# Patient Record
Sex: FEMALE | Race: WHITE | Employment: FULL TIME | URBAN - METROPOLITAN AREA
[De-identification: names, ages, dates, MRNs, and addresses within clinical notes are randomized per-mention and may not be internally consistent; named-entity substitution may affect disease eponyms.]

---

## 2017-05-26 ENCOUNTER — OFFICE VISIT (OUTPATIENT)
Dept: FAMILY MEDICINE CLINIC | Facility: CLINIC | Age: 52
End: 2017-05-26

## 2017-05-26 VITALS
HEART RATE: 78 BPM | WEIGHT: 162.25 LBS | HEIGHT: 67.5 IN | BODY MASS INDEX: 25.17 KG/M2 | DIASTOLIC BLOOD PRESSURE: 72 MMHG | SYSTOLIC BLOOD PRESSURE: 108 MMHG | TEMPERATURE: 98 F | RESPIRATION RATE: 16 BRPM

## 2017-05-26 DIAGNOSIS — J06.9 VIRAL UPPER RESPIRATORY TRACT INFECTION: ICD-10-CM

## 2017-05-26 DIAGNOSIS — J01.00 ACUTE NON-RECURRENT MAXILLARY SINUSITIS: Primary | ICD-10-CM

## 2017-05-26 PROBLEM — J45.909 ASTHMA: Status: ACTIVE | Noted: 2017-05-26

## 2017-05-26 PROCEDURE — 99214 OFFICE O/P EST MOD 30 MIN: CPT | Performed by: FAMILY MEDICINE

## 2017-05-26 RX ORDER — AMOXICILLIN 500 MG/1
500 CAPSULE ORAL 3 TIMES DAILY
Qty: 30 CAPSULE | Refills: 0 | Status: SHIPPED | OUTPATIENT
Start: 2017-05-26 | End: 2017-06-03 | Stop reason: ALTCHOICE

## 2017-05-26 NOTE — PROGRESS NOTES
[facility]  PRO[facility]  Chief Complaint:   Patient presents with:  Sinus Problem: head congestion, green drainage, headache and was feeling very chilled last night      HPI:   This is a 45year old female coming in for sinus congestio pain, joint pain, swelling or stiffness. NEUROLOGICAL:  Denies headache, seizures, dizziness, syncope, paralysis, ataxia, numbness or tingling in the extremities,change in bowel or bladder control. HEMATOLOGIC:  Denies anemia, bleeding or bruising.   LYMP on 08/15/1964  Annual Physical due on 08/15/1966  Pap Smear,3 Years due on 08/15/1995  Mammogram,1 Yr due on 08/15/2004  FIT Colorectal Screening due on 08/15/2014  Colonoscopy,10 Years due on 08/15/2014    Patient/Caregiver Education: Patient/Caregiver Ed

## 2017-06-03 ENCOUNTER — OFFICE VISIT (OUTPATIENT)
Dept: FAMILY MEDICINE CLINIC | Facility: CLINIC | Age: 52
End: 2017-06-03

## 2017-06-03 ENCOUNTER — TELEPHONE (OUTPATIENT)
Dept: FAMILY MEDICINE CLINIC | Facility: CLINIC | Age: 52
End: 2017-06-03

## 2017-06-03 VITALS
WEIGHT: 164.63 LBS | HEART RATE: 74 BPM | BODY MASS INDEX: 25.84 KG/M2 | HEIGHT: 67 IN | SYSTOLIC BLOOD PRESSURE: 116 MMHG | DIASTOLIC BLOOD PRESSURE: 74 MMHG | TEMPERATURE: 98 F | RESPIRATION RATE: 16 BRPM
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
## 2017-06-03 DIAGNOSIS — F41.8 SITUATIONAL ANXIETY: ICD-10-CM

## 2017-06-03 PROCEDURE — 90715 TDAP VACCINE 7 YRS/> IM: CPT | Performed by: NURSE PRACTITIONER

## 2017-06-03 PROCEDURE — 90471 IMMUNIZATION ADMIN: CPT | Performed by: NURSE PRACTITIONER

## 2017-06-03 PROCEDURE — 99213 OFFICE O/P EST LOW 20 MIN: CPT | Performed by: NURSE PRACTITIONER

## 2017-06-03 RX ORDER — MOMETASONE 50 UG/1
1 SPRAY, METERED NASAL DAILY PRN
COMMUNITY
Start: 2014-05-05

## 2017-06-03 RX ORDER — ALBUTEROL SULFATE 90 UG/1
1 AEROSOL, METERED RESPIRATORY (INHALATION) AS NEEDED
COMMUNITY
Start: 2016-11-11 | End: 2019-02-13

## 2017-06-03 RX ORDER — ALPRAZOLAM 0.25 MG/1
0.25 TABLET ORAL 3 TIMES DAILY PRN
Qty: 20 TABLET | Refills: 0 | Status: SHIPPED | OUTPATIENT
Start: 2017-06-03 | End: 2019-08-27

## 2017-06-03 RX ORDER — ALBUTEROL SULFATE 90 UG/1
1 AEROSOL, METERED RESPIRATORY (INHALATION) AS NEEDED
COMMUNITY
Start: 2007-11-14 | End: 2019-06-26

## 2017-06-03 RX ORDER — AMOXICILLIN AND CLAVULANATE POTASSIUM 875; 125 MG/1; MG/1
1 TABLET, FILM COATED ORAL 2 TIMES DAILY
Qty: 14 TABLET | Refills: 0 | Status: SHIPPED | OUTPATIENT
Start: 2017-06-03 | End: 2017-06-10

## 2017-06-03 RX ORDER — ALPRAZOLAM 0.25 MG/1
TABLET ORAL
COMMUNITY
Start: 2012-07-16 | End: 2017-06-03

## 2017-06-03 NOTE — PROGRESS NOTES
Ania Glynn is a 45year old female. Patient presents with:  Animal Bite: bit by dog on the hand      HPI:   Complaints of bite from a dog 3 hrs ago- right hand-  Small shitzu dog-  Met the dog for the first time-  Bite her for no apparent reason. Comment:Other reaction(s): light headed, dizzy    REVIEW OF SYSTEMS:   GENERAL HEALTH: feels well otherwise  SKIN: see HPI  RESPIRATORY: denies complaints of   CARDIOVASCULAR: denies complaints   MUSCULOSKELETAL:  See HPI  NEURO: denies numbness or tinglin Augmentin. If you still have some sinus congestion then finish the full 7 days. If you have no sinus congestion at all and your wound on your hand is totally healed then you may take the prescription only for 5 days.     Tetanus shot today (tetanus, dipht

## 2017-06-03 NOTE — PATIENT INSTRUCTIONS
Stop amoxicillin changed to Augmentin. If you still have some sinus congestion then finish the full 7 days. If you have no sinus congestion at all and your wound on your hand is totally healed then you may take the prescription only for 5 days.     Filiberto Segovia

## 2017-06-03 NOTE — TELEPHONE ENCOUNTER
Patient states she suffered a dog bite this morning  Dog is her brother's dog  Brother has dementia and going through divorce  Patient unsure of status of dog's immunizations  No tetanus imms in Epic or [employer]  Advised patient she need to come in to ge

## 2018-04-10 ENCOUNTER — OFFICE VISIT (OUTPATIENT)
Dept: FAMILY MEDICINE CLINIC | Facility: CLINIC | Age: 53
End: 2018-04-10

## 2018-04-10 VITALS
HEIGHT: 67.75 IN | HEART RATE: 68 BPM | BODY MASS INDEX: 26.37 KG/M2 | DIASTOLIC BLOOD PRESSURE: 64 MMHG | SYSTOLIC BLOOD PRESSURE: 102 MMHG | TEMPERATURE: 98 F | WEIGHT: 172 LBS

## 2018-04-10 DIAGNOSIS — J04.0 LARYNGITIS: Primary | ICD-10-CM

## 2018-04-10 PROCEDURE — 99213 OFFICE O/P EST LOW 20 MIN: CPT | Performed by: NURSE PRACTITIONER

## 2018-04-10 NOTE — PATIENT INSTRUCTIONS
Rest, increase fluids, salt water gargles ,amirah pot (use distilled water) or saline nasal spray, Mucinex, Tylenol/Ibuprofen, follow up if symptoms persist or increase.    Claritin  Laryngitis    Laryngitis is a swelling of the tissues around the vocal cor

## 2018-04-10 NOTE — PROGRESS NOTES
CHIEF COMPLAINT:   Patient presents with:  Cough: productive started last night - slight cough   Voice Problem: no voice since Saturday      HPI:   Siobhan Lara is a 47year old female who presents to clinic today with complaints of laryngitis- st GENERAL: well developed, well nourished,in no apparent distress  HEAD:  mild tenderness on palpation of maxillary sinuses  EYES: conjunctiva clear, no discharge  EARS:.   Tympanic membranes are clear bilaterally  NOSE: nostrils patent, mild congestion  LYMP When to seek medical advice  Contact your healthcare provider for any of the following:  · Severe pain with swallowing  · Trouble opening mouth  · Neck swelling, neck pain, or trouble moving neck  · Noisy breathing or trouble breathing  · Fever of 100.4°F

## 2018-04-13 ENCOUNTER — TELEPHONE (OUTPATIENT)
Dept: FAMILY MEDICINE CLINIC | Facility: CLINIC | Age: 53
End: 2018-04-13

## 2018-04-13 NOTE — TELEPHONE ENCOUNTER
Patient saw Damien Ramírez recently for laryngitis. Was not given antibiotics because it is viral.   But patient states there has not been any improvement. Has been gargling salt water to help and neti pot.  But states the neti pot does not go through the other malgorzata

## 2018-04-13 NOTE — TELEPHONE ENCOUNTER
Laryngitis may take 1-2 weeks to resolve. (See home care instructions at end of office note). Avoid whispering, try to avoid talking as much as possible,  If she taking the Claritin and ibuprofen?   She could add a steroid nasal spray such as Flonase, Eric

## 2018-09-17 ENCOUNTER — OFFICE VISIT (OUTPATIENT)
Dept: FAMILY MEDICINE CLINIC | Facility: CLINIC | Age: 53
End: 2018-09-17
Payer: COMMERCIAL

## 2018-09-17 VITALS
WEIGHT: 173 LBS | RESPIRATION RATE: 18 BRPM | HEART RATE: 62 BPM | BODY MASS INDEX: 26.52 KG/M2 | DIASTOLIC BLOOD PRESSURE: 68 MMHG | TEMPERATURE: 98 F | OXYGEN SATURATION: 98 % | HEIGHT: 67.75 IN | SYSTOLIC BLOOD PRESSURE: 102 MMHG

## 2018-09-17 DIAGNOSIS — J01.00 ACUTE NON-RECURRENT MAXILLARY SINUSITIS: Primary | ICD-10-CM

## 2018-09-17 PROCEDURE — 99214 OFFICE O/P EST MOD 30 MIN: CPT | Performed by: NURSE PRACTITIONER

## 2018-09-17 RX ORDER — AMOXICILLIN 875 MG/1
875 TABLET, COATED ORAL 2 TIMES DAILY
Qty: 20 TABLET | Refills: 0 | Status: SHIPPED | OUTPATIENT
Start: 2018-09-17 | End: 2018-09-27

## 2018-09-17 NOTE — PROGRESS NOTES
CHIEF COMPLAINT:   Patient presents with:  Sinus Problem: facial pressure, head congestion, 4 weeks      HPI:   Naveen Suresh is a 46year old female who presents to clinic today with complaints of sinus pressure for 4 weeks- pain to maxillary sinu complaints    EXAM:   /68 (BP Location: Left arm, Patient Position: Sitting, Cuff Size: large)   Pulse 62   Temp 97.6 °F (36.4 °C) (Temporal)   Resp 18   Ht 67.75\"   Wt 173 lb   SpO2 98%   BMI 26.50 kg/m²   GENERAL: well developed, well nourished,in 2:15 PM

## 2018-09-17 NOTE — PATIENT INSTRUCTIONS
My fitness pal phone anil-  Weight. Watcher's     Rest, increase fluids, salt water gargles ,amirah pot (use distilled water) or saline nasal spray, Tylenol/Ibuprofen,  Claritin,  Flonase, Rhinocort, Nasacort-  2 sprays each nostril once a day.   follow up i

## 2018-09-19 ENCOUNTER — TELEPHONE (OUTPATIENT)
Dept: FAMILY MEDICINE CLINIC | Facility: CLINIC | Age: 53
End: 2018-09-19

## 2018-09-19 DIAGNOSIS — Z00.00 ENCOUNTER FOR HEALTH MAINTENANCE EXAMINATION IN ADULT: Primary | ICD-10-CM

## 2018-09-19 NOTE — TELEPHONE ENCOUNTER
Pt informed. Call routed to appt desk.     Future Appointments   Date Time Provider Quinton Gonzalez   9/26/2018  9:15 AM REF [place] [facility]   10/10/2018 10:00 AM Sarah Hadley MD [facility] [place] [facility] [place]

## 2018-09-19 NOTE — TELEPHONE ENCOUNTER
Fasting blood work orders entered–I believe patient was going to have this prior to a physical please confirm.

## 2018-09-19 NOTE — TELEPHONE ENCOUNTER
If patient would like to see her gynecologist, that is okay, otherwise please let her know that we do do Pap smears here.   We do technically recommend a physical also be done with her primary even if we do not do her Pap smear/breast exam

## 2018-09-19 NOTE — TELEPHONE ENCOUNTER
FYI - Pt notified and verbalized understanding. She states that she is only planning on scheduling a yearly visit with her gyn because it's been a few years since she had a check up there.

## 2018-09-19 NOTE — TELEPHONE ENCOUNTER
Your appointments     Date & Time Appointment Department [facility])    Sep 26, 2018  9:15 AM CDT Laboratory Visit with REF [facility] ([facility])        [facility]  [facility] [address]

## 2018-09-26 ENCOUNTER — LABORATORY ENCOUNTER (OUTPATIENT)
Dept: LAB | Age: 53
End: 2018-09-26
Attending: FAMILY MEDICINE
Payer: COMMERCIAL

## 2018-09-26 DIAGNOSIS — Z00.00 ENCOUNTER FOR HEALTH MAINTENANCE EXAMINATION IN ADULT: ICD-10-CM

## 2018-09-26 LAB
ALBUMIN SERPL-MCNC: 3.5 G/DL (ref 3.5–4.8)
ALBUMIN/GLOB SERPL: 1.1 {RATIO} (ref 1–2)
ALP LIVER SERPL-CCNC: 58 U/L (ref 41–108)
ALT SERPL-CCNC: 26 U/L (ref 14–54)
ANION GAP SERPL CALC-SCNC: 6 MMOL/L (ref 0–18)
AST SERPL-CCNC: 18 U/L (ref 15–41)
BASOPHILS # BLD AUTO: 0.02 X10(3) UL (ref 0–0.1)
BASOPHILS NFR BLD AUTO: 0.4 %
BILIRUB SERPL-MCNC: 0.6 MG/DL (ref 0.1–2)
BUN BLD-MCNC: 9 MG/DL (ref 8–20)
BUN/CREAT SERPL: 10.5 (ref 10–20)
CALCIUM BLD-MCNC: 8.8 MG/DL (ref 8.3–10.3)
CHLORIDE SERPL-SCNC: 107 MMOL/L (ref 101–111)
CHOLEST SMN-MCNC: 214 MG/DL (ref ?–200)
CO2 SERPL-SCNC: 27 MMOL/L (ref 22–32)
CREAT BLD-MCNC: 0.86 MG/DL (ref 0.55–1.02)
EOSINOPHIL # BLD AUTO: 0.14 X10(3) UL (ref 0–0.3)
EOSINOPHIL NFR BLD AUTO: 3 %
ERYTHROCYTE [DISTWIDTH] IN BLOOD BY AUTOMATED COUNT: 13.2 % (ref 11.5–16)
GLOBULIN PLAS-MCNC: 3.1 G/DL (ref 2.5–4)
GLUCOSE BLD-MCNC: 91 MG/DL (ref 70–99)
HAV AB SERPL IA-ACNC: 369 PG/ML (ref 193–986)
HCT VFR BLD AUTO: 38.1 % (ref 34–50)
HDLC SERPL-MCNC: 70 MG/DL (ref 40–59)
HGB BLD-MCNC: 12.4 G/DL (ref 12–16)
IMMATURE GRANULOCYTE COUNT: 0.01 X10(3) UL (ref 0–1)
IMMATURE GRANULOCYTE RATIO %: 0.2 %
LDLC SERPL CALC-MCNC: 126 MG/DL (ref ?–100)
LYMPHOCYTES # BLD AUTO: 1.57 X10(3) UL (ref 0.9–4)
LYMPHOCYTES NFR BLD AUTO: 33.1 %
M PROTEIN MFR SERPL ELPH: 6.6 G/DL (ref 6.1–8.3)
MCH RBC QN AUTO: 30 PG (ref 27–33.2)
MCHC RBC AUTO-ENTMCNC: 32.5 G/DL (ref 31–37)
MCV RBC AUTO: 92.3 FL (ref 81–100)
MONOCYTES # BLD AUTO: 0.35 X10(3) UL (ref 0.1–1)
MONOCYTES NFR BLD AUTO: 7.4 %
NEUTROPHIL ABS PRELIM: 2.65 X10 (3) UL (ref 1.3–6.7)
NEUTROPHILS # BLD AUTO: 2.65 X10(3) UL (ref 1.3–6.7)
NEUTROPHILS NFR BLD AUTO: 55.9 %
NONHDLC SERPL-MCNC: 144 MG/DL (ref ?–130)
OSMOLALITY SERPL CALC.SUM OF ELEC: 288 MOSM/KG (ref 275–295)
PLATELET # BLD AUTO: 235 10(3)UL (ref 150–450)
POTASSIUM SERPL-SCNC: 4.2 MMOL/L (ref 3.6–5.1)
RBC # BLD AUTO: 4.13 X10(6)UL (ref 3.8–5.1)
RED CELL DISTRIBUTION WIDTH-SD: 44.1 FL (ref 35.1–46.3)
SODIUM SERPL-SCNC: 140 MMOL/L (ref 136–144)
T4 FREE SERPL-MCNC: 1 NG/DL (ref 0.9–1.8)
TRIGL SERPL-MCNC: 91 MG/DL (ref 30–149)
TSI SER-ACNC: 2.27 MIU/ML (ref 0.35–5.5)
VIT D+METAB SERPL-MCNC: 18.1 NG/ML (ref 30–100)
VLDLC SERPL CALC-MCNC: 18 MG/DL (ref 0–30)
WBC # BLD AUTO: 4.7 X10(3) UL (ref 4–13)

## 2018-09-26 PROCEDURE — 82607 VITAMIN B-12: CPT | Performed by: NURSE PRACTITIONER

## 2018-09-26 PROCEDURE — 36415 COLL VENOUS BLD VENIPUNCTURE: CPT | Performed by: NURSE PRACTITIONER

## 2018-09-26 PROCEDURE — 84439 ASSAY OF FREE THYROXINE: CPT | Performed by: NURSE PRACTITIONER

## 2018-09-26 PROCEDURE — 82306 VITAMIN D 25 HYDROXY: CPT | Performed by: NURSE PRACTITIONER

## 2018-09-26 PROCEDURE — 80050 GENERAL HEALTH PANEL: CPT | Performed by: NURSE PRACTITIONER

## 2018-09-26 PROCEDURE — 80061 LIPID PANEL: CPT | Performed by: NURSE PRACTITIONER

## 2018-09-27 ENCOUNTER — TELEPHONE (OUTPATIENT)
Dept: FAMILY MEDICINE CLINIC | Facility: CLINIC | Age: 53
End: 2018-09-27

## 2018-09-27 NOTE — TELEPHONE ENCOUNTER
----- Message from ALEKSANDR García sent at 9/27/2018 11:48 AM CDT -----  Please notify patient that overall her blood work is good, her cholesterol is just a little high at 214, however good cholesterol is very high at 70 which is a good thing, her LD

## 2019-02-13 ENCOUNTER — OFFICE VISIT (OUTPATIENT)
Dept: FAMILY MEDICINE CLINIC | Facility: CLINIC | Age: 54
End: 2019-02-13
Payer: COMMERCIAL

## 2019-02-13 ENCOUNTER — HOSPITAL ENCOUNTER (OUTPATIENT)
Dept: GENERAL RADIOLOGY | Age: 54
Discharge: HOME | End: 2019-02-13
Attending: NURSE PRACTITIONER
Payer: COMMERCIAL

## 2019-02-13 VITALS
DIASTOLIC BLOOD PRESSURE: 70 MMHG | TEMPERATURE: 99 F | BODY MASS INDEX: 20.47 KG/M2 | SYSTOLIC BLOOD PRESSURE: 116 MMHG | WEIGHT: 132 LBS | HEIGHT: 67.25 IN

## 2019-02-13 DIAGNOSIS — M54.50 CHRONIC RIGHT-SIDED LOW BACK PAIN WITHOUT SCIATICA: Primary | ICD-10-CM

## 2019-02-13 DIAGNOSIS — M54.50 CHRONIC RIGHT-SIDED LOW BACK PAIN WITHOUT SCIATICA: ICD-10-CM

## 2019-02-13 DIAGNOSIS — G89.29 CHRONIC RIGHT-SIDED LOW BACK PAIN WITHOUT SCIATICA: Primary | ICD-10-CM

## 2019-02-13 DIAGNOSIS — G89.29 CHRONIC RIGHT-SIDED LOW BACK PAIN WITHOUT SCIATICA: ICD-10-CM

## 2019-02-13 PROBLEM — F41.9 ANXIETY AND DEPRESSION: Status: ACTIVE | Noted: 2017-06-03

## 2019-02-13 PROBLEM — F32.A ANXIETY AND DEPRESSION: Status: ACTIVE | Noted: 2017-06-03

## 2019-02-13 PROCEDURE — 99214 OFFICE O/P EST MOD 30 MIN: CPT | Performed by: NURSE PRACTITIONER

## 2019-02-13 PROCEDURE — 72110 X-RAY EXAM L-2 SPINE 4/>VWS: CPT | Performed by: NURSE PRACTITIONER

## 2019-02-13 RX ORDER — MELOXICAM 15 MG/1
15 TABLET ORAL DAILY
Qty: 30 TABLET | Refills: 3 | Status: SHIPPED | OUTPATIENT
Start: 2019-02-13 | End: 2019-08-17 | Stop reason: DRUGHIGH

## 2019-02-13 RX ORDER — CYCLOBENZAPRINE HCL 10 MG
10 TABLET ORAL NIGHTLY PRN
Qty: 30 TABLET | Refills: 1 | Status: SHIPPED | OUTPATIENT
Start: 2019-02-13 | End: 2019-03-05

## 2019-02-13 NOTE — PROGRESS NOTES
Micheline Osborn is a 46year old female. Patient presents with:   Other: referral for PT      HPI:   Complaints of back pain - has seen the Chiropractor, years- mostly lower back- and shoulders   Has not had an x-ray in years   States she has been seei Allergies    Levofloxacin                Comment:Other reaction(s): light headed, dizzy    REVIEW OF SYSTEMS:   GENERAL HEALTH: feels well otherwise  HEENT: denies complaints  SKIN: denies any unusual skin lesions or rashes  RESPIRATORY: denies shortne orthotics, you may try heat or ice to your back. Take meloxicam once a day with food. You may take cyclobenzaprine at bedtime for muscle relaxation.     Follow-up with physical therapy

## 2019-02-14 ENCOUNTER — TELEPHONE (OUTPATIENT)
Dept: FAMILY MEDICINE CLINIC | Facility: CLINIC | Age: 54
End: 2019-02-14

## 2019-02-14 DIAGNOSIS — G89.29 CHRONIC LOW BACK PAIN WITHOUT SCIATICA, UNSPECIFIED BACK PAIN LATERALITY: Primary | ICD-10-CM

## 2019-02-14 DIAGNOSIS — M54.50 CHRONIC LOW BACK PAIN WITHOUT SCIATICA, UNSPECIFIED BACK PAIN LATERALITY: Primary | ICD-10-CM

## 2019-06-17 PROBLEM — R92.2 DENSE BREASTS: Status: ACTIVE | Noted: 2019-06-17

## 2019-06-17 PROBLEM — N89.8 VAGINAL CYSTS: Status: ACTIVE | Noted: 2019-06-17

## 2019-06-17 PROCEDURE — 88175 CYTOPATH C/V AUTO FLUID REDO: CPT | Performed by: OBSTETRICS & GYNECOLOGY

## 2019-06-17 PROCEDURE — 87624 HPV HI-RISK TYP POOLED RSLT: CPT | Performed by: OBSTETRICS & GYNECOLOGY

## 2019-06-26 ENCOUNTER — TELEPHONE (OUTPATIENT)
Dept: FAMILY MEDICINE CLINIC | Facility: CLINIC | Age: 54
End: 2019-06-26

## 2019-06-26 RX ORDER — ALBUTEROL SULFATE 90 UG/1
1 AEROSOL, METERED RESPIRATORY (INHALATION) AS NEEDED
Qty: 1 INHALER | Refills: 0 | Status: SHIPPED | OUTPATIENT
Start: 2019-06-26 | End: 2019-08-27

## 2019-06-26 NOTE — TELEPHONE ENCOUNTER
RE:   taking a flight in August    in the past Betzy Garrickrip has prescibed a medication for her to take pre-flight // Also needs RF of inhaler ( uses PRN)      RX to [facility] in [place]     please advise

## 2019-06-26 NOTE — TELEPHONE ENCOUNTER
Pt states she is traveling to [facility] in Aug or Sept , would like a refill of XAnax. Last issued back . Pt also looking for a refill of her inhaler. Pt hx: of asthma. Pt is only using prn,  Pt states her inhaler is .     Pt last seen in

## 2019-06-26 NOTE — TELEPHONE ENCOUNTER
Pt informed, appt given.     Future Appointments   Date Time Provider Quinton Duffyi   8/5/2019  1:30 PM Veronica Campbell MD [facility] [place] [facility]   6/17/2020  9:15 AM Juan Medina MD [place] [id number]

## 2019-06-26 NOTE — TELEPHONE ENCOUNTER
Patient chart reviewed. Patient list me as her primary care provider although I have not ever seen her. I do advise an appointment for a medical physical and asthma follow-up. Patient may have a prescription for 1 inhaler to avoid any gap in care.   Disc

## 2019-08-16 ENCOUNTER — TELEPHONE (OUTPATIENT)
Dept: FAMILY MEDICINE CLINIC | Facility: CLINIC | Age: 54
End: 2019-08-16

## 2019-08-16 NOTE — TELEPHONE ENCOUNTER
Pt states that she feels that she may have a sinus infection. Pt c/o sinus pressure, thick nasal drainage- yellow/green, and sinus headache. Pt states s/s started on Monday. Pt states she had chills on Tuesday.   Pt states she had done sinus rinse otc an

## 2019-08-17 ENCOUNTER — OFFICE VISIT (OUTPATIENT)
Dept: FAMILY MEDICINE CLINIC | Facility: CLINIC | Age: 54
End: 2019-08-17
Payer: COMMERCIAL

## 2019-08-17 VITALS
TEMPERATURE: 97 F | HEART RATE: 76 BPM | HEIGHT: 67 IN | WEIGHT: 172 LBS | RESPIRATION RATE: 16 BRPM | DIASTOLIC BLOOD PRESSURE: 60 MMHG | BODY MASS INDEX: 27 KG/M2 | SYSTOLIC BLOOD PRESSURE: 96 MMHG | OXYGEN SATURATION: 99 %

## 2019-08-17 DIAGNOSIS — J32.9 SINOBRONCHITIS: Primary | ICD-10-CM

## 2019-08-17 DIAGNOSIS — J40 SINOBRONCHITIS: Primary | ICD-10-CM

## 2019-08-17 DIAGNOSIS — H66.93 ACUTE EAR INFECTION, BILATERAL: ICD-10-CM

## 2019-08-17 PROCEDURE — 99214 OFFICE O/P EST MOD 30 MIN: CPT | Performed by: NURSE PRACTITIONER

## 2019-08-17 RX ORDER — MELOXICAM 15 MG/1
15 TABLET ORAL
COMMUNITY
End: 2019-08-27 | Stop reason: ALTCHOICE

## 2019-08-17 RX ORDER — AMOXICILLIN AND CLAVULANATE POTASSIUM 875; 125 MG/1; MG/1
1 TABLET, FILM COATED ORAL 2 TIMES DAILY
Qty: 20 TABLET | Refills: 0 | Status: SHIPPED | OUTPATIENT
Start: 2019-08-17 | End: 2019-08-27 | Stop reason: ALTCHOICE

## 2019-08-17 NOTE — PATIENT INSTRUCTIONS
Directed to take antibiotics until gone. Recommend to eat yogurt or take probiotic daily while on antibiotic, but not the same time as the antibiotic. Treat symptoms as needed. Return to clinic if not better in 48-72 hours.   Otherwise follow-up as need

## 2019-08-17 NOTE — PROGRESS NOTES
HPI:    Patient ID: Chuck Raya is a 53year old female. HPI     Started with congestin with ST on Monday. Started in head and now is moving down. Has a hx of sinus infections. Is using erick patient.  States that headaches are terrible - did hav well-developed and well-nourished. No distress. HENT:   Head: Normocephalic and atraumatic. Right Ear: Hearing, external ear and ear canal normal. Tympanic membrane is erythematous and retracted.    Left Ear: Hearing, external ear and ear canal normal.

## 2019-08-27 ENCOUNTER — OFFICE VISIT (OUTPATIENT)
Dept: FAMILY MEDICINE CLINIC | Facility: CLINIC | Age: 54
End: 2019-08-27
Payer: COMMERCIAL

## 2019-08-27 VITALS
OXYGEN SATURATION: 99 % | BODY MASS INDEX: 27.49 KG/M2 | WEIGHT: 175.19 LBS | HEIGHT: 67 IN | RESPIRATION RATE: 14 BRPM | HEART RATE: 60 BPM | DIASTOLIC BLOOD PRESSURE: 66 MMHG | SYSTOLIC BLOOD PRESSURE: 104 MMHG | TEMPERATURE: 98 F

## 2019-08-27 DIAGNOSIS — F32.A ANXIETY AND DEPRESSION: ICD-10-CM

## 2019-08-27 DIAGNOSIS — Z00.00 ANNUAL PHYSICAL EXAM: Primary | ICD-10-CM

## 2019-08-27 DIAGNOSIS — N39.3 STRESS INCONTINENCE: ICD-10-CM

## 2019-08-27 DIAGNOSIS — F41.9 ANXIETY AND DEPRESSION: ICD-10-CM

## 2019-08-27 PROCEDURE — 99396 PREV VISIT EST AGE 40-64: CPT | Performed by: FAMILY MEDICINE

## 2019-08-27 RX ORDER — ALPRAZOLAM 0.25 MG/1
0.25 TABLET ORAL 3 TIMES DAILY PRN
Qty: 20 TABLET | Refills: 0 | Status: SHIPPED | OUTPATIENT
Start: 2019-08-27 | End: 2020-09-08

## 2019-08-27 NOTE — PROGRESS NOTES
[place] [facility] [place]  PROGRESS NOTE  Chief Complaint:   Patient presents with:  Physical      HPI:   This is a 53year old female coming in for annual check up. Pt sees gyne.     Monitor weight- menopausal  Watching diet and trying to exercise    R False negative and false positive results can occur. Regular sampling is recommended to minimize false negative results.       Case Report       Gynecologic Cytology                              Case: [id number]                                  YWNIVUNSHFE Onset   • High Blood Pressure Mother    • High Blood Pressure Brother    • High Blood Pressure Sister    • Diabetes Paternal Grandmother      Allergies:    Levofloxacin                Comment:Other reaction(s): light headed, dizzy  Current Meds:    Current intolerance, polyuria or polydipsia. ALLERGIES:  Denies allergic response, history of asthma, sneezing, hives, eczema or rhinitis.      EXAM:   /66 (BP Location: Left arm, Patient Position: Sitting, Cuff Size: adult)   Pulse 60   Temp 97.8 °F (36.6 ° Future  - TSH W REFLEX TO FREE T4; Future  - URINALYSIS WITH CULTURE REFLEX; Future  - VITAMIN D, 25-HYDROXY; Future    2. Anxiety and depression  Discussed with patient use of Xanax for travel related anxiety.     3. Stress incontinence  New issue I suppor

## 2019-08-27 NOTE — PATIENT INSTRUCTIONS
rec fasting labs-- recheck low vit D and cholesterol    rec yearly mammogram    Monitor  sebaceous cyst- left chest wall  Consider surgical removal    Consider [employer] for pelvic floor therapy    Continue flonase     ok for xanax for travel a

## 2019-08-30 ENCOUNTER — LABORATORY ENCOUNTER (OUTPATIENT)
Dept: LAB | Age: 54
End: 2019-08-30
Attending: FAMILY MEDICINE
Payer: COMMERCIAL

## 2019-08-30 DIAGNOSIS — Z00.00 ANNUAL PHYSICAL EXAM: ICD-10-CM

## 2019-08-30 LAB
ALBUMIN SERPL-MCNC: 3.7 G/DL (ref 3.4–5)
ALBUMIN/GLOB SERPL: 1.2 {RATIO} (ref 1–2)
ALP LIVER SERPL-CCNC: 59 U/L (ref 41–108)
ALT SERPL-CCNC: 24 U/L (ref 13–56)
ANION GAP SERPL CALC-SCNC: 7 MMOL/L (ref 0–18)
AST SERPL-CCNC: 23 U/L (ref 15–37)
BASOPHILS # BLD AUTO: 0.05 X10(3) UL (ref 0–0.2)
BASOPHILS NFR BLD AUTO: 0.8 %
BILIRUB SERPL-MCNC: 0.6 MG/DL (ref 0.1–2)
BILIRUB UR QL STRIP.AUTO: NEGATIVE
BUN BLD-MCNC: 13 MG/DL (ref 7–18)
BUN/CREAT SERPL: 14.6 (ref 10–20)
CALCIUM BLD-MCNC: 9.2 MG/DL (ref 8.5–10.1)
CHLORIDE SERPL-SCNC: 105 MMOL/L (ref 98–112)
CHOLEST SMN-MCNC: 216 MG/DL (ref ?–200)
CLARITY UR REFRACT.AUTO: CLEAR
CO2 SERPL-SCNC: 26 MMOL/L (ref 21–32)
CREAT BLD-MCNC: 0.89 MG/DL (ref 0.55–1.02)
DEPRECATED RDW RBC AUTO: 43.6 FL (ref 35.1–46.3)
EOSINOPHIL # BLD AUTO: 0.23 X10(3) UL (ref 0–0.7)
EOSINOPHIL NFR BLD AUTO: 3.7 %
ERYTHROCYTE [DISTWIDTH] IN BLOOD BY AUTOMATED COUNT: 12.7 % (ref 11–15)
GLOBULIN PLAS-MCNC: 3.1 G/DL (ref 2.8–4.4)
GLUCOSE BLD-MCNC: 83 MG/DL (ref 70–99)
GLUCOSE UR STRIP.AUTO-MCNC: NEGATIVE MG/DL
HCT VFR BLD AUTO: 41.5 % (ref 35–48)
HDLC SERPL-MCNC: 73 MG/DL (ref 40–59)
HGB BLD-MCNC: 13.2 G/DL (ref 12–16)
IMM GRANULOCYTES # BLD AUTO: 0.02 X10(3) UL (ref 0–1)
IMM GRANULOCYTES NFR BLD: 0.3 %
KETONES UR STRIP.AUTO-MCNC: NEGATIVE MG/DL
LDLC SERPL CALC-MCNC: 114 MG/DL (ref ?–100)
LEUKOCYTE ESTERASE UR QL STRIP.AUTO: NEGATIVE
LYMPHOCYTES # BLD AUTO: 1.75 X10(3) UL (ref 1–4)
LYMPHOCYTES NFR BLD AUTO: 28.5 %
M PROTEIN MFR SERPL ELPH: 6.8 G/DL (ref 6.4–8.2)
MCH RBC QN AUTO: 29.7 PG (ref 26–34)
MCHC RBC AUTO-ENTMCNC: 31.8 G/DL (ref 31–37)
MCV RBC AUTO: 93.5 FL (ref 80–100)
MONOCYTES # BLD AUTO: 0.38 X10(3) UL (ref 0.1–1)
MONOCYTES NFR BLD AUTO: 6.2 %
NEUTROPHILS # BLD AUTO: 3.71 X10 (3) UL (ref 1.5–7.7)
NEUTROPHILS # BLD AUTO: 3.71 X10(3) UL (ref 1.5–7.7)
NEUTROPHILS NFR BLD AUTO: 60.5 %
NITRITE UR QL STRIP.AUTO: NEGATIVE
NONHDLC SERPL-MCNC: 143 MG/DL (ref ?–130)
OSMOLALITY SERPL CALC.SUM OF ELEC: 285 MOSM/KG (ref 275–295)
PH UR STRIP.AUTO: 7 [PH] (ref 4.5–8)
PLATELET # BLD AUTO: 264 10(3)UL (ref 150–450)
POTASSIUM SERPL-SCNC: 4.2 MMOL/L (ref 3.5–5.1)
PROT UR STRIP.AUTO-MCNC: NEGATIVE MG/DL
RBC # BLD AUTO: 4.44 X10(6)UL (ref 3.8–5.3)
RBC UR QL AUTO: NEGATIVE
SODIUM SERPL-SCNC: 138 MMOL/L (ref 136–145)
SP GR UR STRIP.AUTO: 1.01 (ref 1–1.03)
TRIGL SERPL-MCNC: 146 MG/DL (ref 30–149)
TSI SER-ACNC: 1.99 MIU/ML (ref 0.36–3.74)
UROBILINOGEN UR STRIP.AUTO-MCNC: <2 MG/DL
VIT D+METAB SERPL-MCNC: 19.5 NG/ML (ref 30–100)
VLDLC SERPL CALC-MCNC: 29 MG/DL (ref 0–30)
WBC # BLD AUTO: 6.1 X10(3) UL (ref 4–11)

## 2019-08-30 PROCEDURE — 80050 GENERAL HEALTH PANEL: CPT | Performed by: FAMILY MEDICINE

## 2019-08-30 PROCEDURE — 81003 URINALYSIS AUTO W/O SCOPE: CPT | Performed by: FAMILY MEDICINE

## 2019-08-30 PROCEDURE — 82306 VITAMIN D 25 HYDROXY: CPT | Performed by: FAMILY MEDICINE

## 2019-08-30 PROCEDURE — 80061 LIPID PANEL: CPT | Performed by: FAMILY MEDICINE

## 2019-08-30 PROCEDURE — 36415 COLL VENOUS BLD VENIPUNCTURE: CPT | Performed by: FAMILY MEDICINE

## 2019-08-31 ENCOUNTER — TELEPHONE (OUTPATIENT)
Dept: FAMILY MEDICINE CLINIC | Facility: CLINIC | Age: 54
End: 2019-08-31

## 2019-08-31 DIAGNOSIS — E55.9 VITAMIN D DEFICIENCY: Primary | ICD-10-CM

## 2019-08-31 NOTE — TELEPHONE ENCOUNTER
----- Message from Cheyenne Sparks MD sent at 8/31/2019 11:33 AM CDT -----  Urinalysis normal.  Vitamin D level quite low at 19.5. Chemistry profile normal.  Lipid profile just slightly elevated encourage heart healthy diet and exercise.   Thyroid funct

## 2019-09-04 NOTE — TELEPHONE ENCOUNTER
Pt called back, pt did not yet view results on my chart. Results and recommendations reviewed with pt. Pt would rather take daily supplement for Vitamin  D otc. Please advise on daily dose. Pt states that she can repeat level in 3 months.   Pt states

## 2019-12-18 ENCOUNTER — APPOINTMENT (OUTPATIENT)
Dept: LAB | Age: 54
End: 2019-12-18
Attending: FAMILY MEDICINE
Payer: COMMERCIAL

## 2019-12-18 DIAGNOSIS — E55.9 VITAMIN D DEFICIENCY: ICD-10-CM

## 2019-12-18 PROCEDURE — 36415 COLL VENOUS BLD VENIPUNCTURE: CPT | Performed by: FAMILY MEDICINE

## 2019-12-18 PROCEDURE — 82306 VITAMIN D 25 HYDROXY: CPT | Performed by: FAMILY MEDICINE

## 2019-12-19 ENCOUNTER — TELEPHONE (OUTPATIENT)
Dept: FAMILY MEDICINE CLINIC | Facility: CLINIC | Age: 54
End: 2019-12-19

## 2019-12-19 NOTE — TELEPHONE ENCOUNTER
Informed pt of her Vit D level. Pt will take 2000 units daily. Pt expressed understanding and thanks.

## 2019-12-19 NOTE — TELEPHONE ENCOUNTER
----- Message from Pablo Huntley MD sent at 12/18/2019  5:31 PM CST -----  Normal vitamin D level. Reassuring. Patient is recommended to take vitamin D 2000 international units daily. Results forwarded to patient via [employer] system.   Patient encour

## 2020-08-20 ENCOUNTER — TELEPHONE (OUTPATIENT)
Dept: FAMILY MEDICINE CLINIC | Facility: CLINIC | Age: 55
End: 2020-08-20

## 2020-08-20 NOTE — TELEPHONE ENCOUNTER
Pt states that she has a slight cold x 2 days with sx of runny nose with clear mucus, slight cough nonproductive. She denies any fever. She denies any other sx on the travel screen.      She states that she only stays in touch with her kids and grand k

## 2020-08-20 NOTE — TELEPHONE ENCOUNTER
covid test questions - has been around people with a summer cold, had a cold but is better, mild cough

## 2020-08-21 NOTE — TELEPHONE ENCOUNTER
Reviewed. Is encouraging that patient has felt better after only very mild symptoms. Patient is unlikely to have COVID and is had no COVID-19 exposure. Has patient has been afebrile she is okay for activity as tolerates.   Patient should strictly follow

## 2020-08-21 NOTE — TELEPHONE ENCOUNTER
Pt states she had a good night,   Pt states didn't cough last night, and hasn't coughed yet this AM.  Nasal congestion is clear and thick, but is lessening. Pt denies fever, denies shortness of breath, and denies chest tightness.     Pt mentioned that her

## 2020-09-08 ENCOUNTER — OFFICE VISIT (OUTPATIENT)
Dept: FAMILY MEDICINE CLINIC | Facility: CLINIC | Age: 55
End: 2020-09-08
Payer: COMMERCIAL

## 2020-09-08 VITALS
TEMPERATURE: 97 F | SYSTOLIC BLOOD PRESSURE: 122 MMHG | WEIGHT: 178 LBS | DIASTOLIC BLOOD PRESSURE: 70 MMHG | HEART RATE: 68 BPM | HEIGHT: 68.25 IN | BODY MASS INDEX: 26.98 KG/M2 | OXYGEN SATURATION: 98 %

## 2020-09-08 DIAGNOSIS — D17.21 LIPOMA OF RIGHT UPPER EXTREMITY: Primary | ICD-10-CM

## 2020-09-08 DIAGNOSIS — J45.20 MILD INTERMITTENT ASTHMA WITHOUT COMPLICATION: ICD-10-CM

## 2020-09-08 DIAGNOSIS — F32.A ANXIETY AND DEPRESSION: ICD-10-CM

## 2020-09-08 DIAGNOSIS — F41.9 ANXIETY AND DEPRESSION: ICD-10-CM

## 2020-09-08 PROCEDURE — 99214 OFFICE O/P EST MOD 30 MIN: CPT | Performed by: FAMILY MEDICINE

## 2020-09-08 PROCEDURE — 3078F DIAST BP <80 MM HG: CPT | Performed by: FAMILY MEDICINE

## 2020-09-08 PROCEDURE — 3008F BODY MASS INDEX DOCD: CPT | Performed by: FAMILY MEDICINE

## 2020-09-08 PROCEDURE — 3074F SYST BP LT 130 MM HG: CPT | Performed by: FAMILY MEDICINE

## 2020-09-08 RX ORDER — ALPRAZOLAM 0.25 MG/1
0.25 TABLET ORAL 3 TIMES DAILY PRN
Qty: 20 TABLET | Refills: 0 | Status: SHIPPED | OUTPATIENT
Start: 2020-09-08

## 2020-09-08 NOTE — PROGRESS NOTES
Marlo Goltz is a 63year old female. Patient presents with:  Lump: back of left armpit x 1.5 weeks   Refill Request: ProAir and Xanax for flight      HPI:   Patient presents for multiple issues.   Patient with complaints of lump on the back of her Denies     Results for orders placed or performed in visit on 07/28/20   THINPREP PAP- RFX TO HPV IF ASCU   Result Value Ref Range    DIAGNOSIS: Comment     Specimen adequacy: Comment     Clinician provided ICD10: Comment     Performed by: Comment     . Dave Stallings

## 2020-09-08 NOTE — PATIENT INSTRUCTIONS
[facility] for refill medications for as needed use for asthma and anxiety  Monitor lipoma  F.u as needed

## 2020-11-25 ENCOUNTER — OFFICE VISIT (OUTPATIENT)
Dept: FAMILY MEDICINE CLINIC | Facility: CLINIC | Age: 55
End: 2020-11-25
Payer: COMMERCIAL

## 2020-11-25 ENCOUNTER — HOSPITAL ENCOUNTER (OUTPATIENT)
Dept: GENERAL RADIOLOGY | Age: 55
Discharge: HOME | End: 2020-11-25
Attending: NURSE PRACTITIONER
Payer: COMMERCIAL

## 2020-11-25 ENCOUNTER — TELEPHONE (OUTPATIENT)
Dept: FAMILY MEDICINE CLINIC | Facility: CLINIC | Age: 55
End: 2020-11-25

## 2020-11-25 VITALS
WEIGHT: 179.81 LBS | BODY MASS INDEX: 27.25 KG/M2 | SYSTOLIC BLOOD PRESSURE: 100 MMHG | HEART RATE: 92 BPM | TEMPERATURE: 99 F | HEIGHT: 68 IN | OXYGEN SATURATION: 97 % | RESPIRATION RATE: 16 BRPM | DIASTOLIC BLOOD PRESSURE: 72 MMHG

## 2020-11-25 DIAGNOSIS — S92.502A CLOSED FRACTURE OF PHALANX OF LEFT FIFTH TOE, INITIAL ENCOUNTER: Primary | ICD-10-CM

## 2020-11-25 DIAGNOSIS — S97.82XA CRUSHING INJURY OF LEFT FOOT, INITIAL ENCOUNTER: ICD-10-CM

## 2020-11-25 PROCEDURE — 99072 ADDL SUPL MATRL&STAF TM PHE: CPT | Performed by: NURSE PRACTITIONER

## 2020-11-25 PROCEDURE — 3074F SYST BP LT 130 MM HG: CPT | Performed by: NURSE PRACTITIONER

## 2020-11-25 PROCEDURE — 3078F DIAST BP <80 MM HG: CPT | Performed by: NURSE PRACTITIONER

## 2020-11-25 PROCEDURE — 99213 OFFICE O/P EST LOW 20 MIN: CPT | Performed by: NURSE PRACTITIONER

## 2020-11-25 PROCEDURE — 73630 X-RAY EXAM OF FOOT: CPT | Performed by: NURSE PRACTITIONER

## 2020-11-25 PROCEDURE — 3008F BODY MASS INDEX DOCD: CPT | Performed by: NURSE PRACTITIONER

## 2020-11-25 NOTE — TELEPHONE ENCOUNTER
Called patient -  Left a message to call back.    Please notify patient that she does have a fracture the results are as below–  \"There is a complete oblique fracture involving the distal diaphysis of the left 5th proximal phalanx with adjacent soft tissue

## 2020-11-25 NOTE — PATIENT INSTRUCTIONS
Take Ibuprofen 600mg three times a day with food, or aleve 2 pills twice a day with food.     Rest, ice, elevate, tape toes together,

## 2020-11-25 NOTE — PROGRESS NOTES
Chuck Raya is a 63year old female. Patient presents with:   Toe Pain: L pinky toe/foot pain x2 weeks - stubbed toe/foot on bedframe  Fall: Karol Bolls down the stairs 2 days ago      HPI:   Complaints of stubbed pinky toe to left foot -2 weeks ago bruis Brother    • High Blood Pressure Sister    • Diabetes Paternal Grandmother         Allergies    Levofloxacin                Comment:Other reaction(s): light headed, dizzy    REVIEW OF SYSTEMS:   GENERAL HEALTH: feels well otherwise  HEENT: denies complaint

## 2021-03-26 ENCOUNTER — OFFICE VISIT (OUTPATIENT)
Dept: FAMILY MEDICINE CLINIC | Facility: CLINIC | Age: 56
End: 2021-03-26
Payer: COMMERCIAL

## 2021-03-26 ENCOUNTER — TELEPHONE (OUTPATIENT)
Dept: FAMILY MEDICINE CLINIC | Facility: CLINIC | Age: 56
End: 2021-03-26

## 2021-03-26 VITALS
DIASTOLIC BLOOD PRESSURE: 66 MMHG | TEMPERATURE: 98 F | HEART RATE: 88 BPM | HEIGHT: 68 IN | OXYGEN SATURATION: 98 % | SYSTOLIC BLOOD PRESSURE: 110 MMHG | WEIGHT: 179 LBS | RESPIRATION RATE: 16 BRPM | BODY MASS INDEX: 27.13 KG/M2

## 2021-03-26 DIAGNOSIS — R42 VERTIGO: ICD-10-CM

## 2021-03-26 DIAGNOSIS — R60.0 PEDAL EDEMA: Primary | ICD-10-CM

## 2021-03-26 PROBLEM — E55.9 VITAMIN D DEFICIENCY: Status: ACTIVE | Noted: 2019-12-01

## 2021-03-26 PROCEDURE — 3078F DIAST BP <80 MM HG: CPT | Performed by: FAMILY MEDICINE

## 2021-03-26 PROCEDURE — 3074F SYST BP LT 130 MM HG: CPT | Performed by: FAMILY MEDICINE

## 2021-03-26 PROCEDURE — 99214 OFFICE O/P EST MOD 30 MIN: CPT | Performed by: FAMILY MEDICINE

## 2021-03-26 PROCEDURE — 3008F BODY MASS INDEX DOCD: CPT | Performed by: FAMILY MEDICINE

## 2021-03-26 NOTE — PROGRESS NOTES
[address]  PROGRESS NOTE  Chief Complaint:   Patient presents with:  Dizziness: Pt states lightheadedness started about 3 days ago  Swelling: Pt states swelling from knee down with slight pain for also the last 3 days      HPI:   This i Test Methodology: Comment     .  Comment        Wt Readings from Last 6 Encounters:  03/26/21 : 179 lb (81.2 kg)  11/25/20 : 179 lb 12.8 oz (81.6 kg)  09/08/20 : 178 lb (80.7 kg)  07/28/20 : 175 lb (79.4 kg)  08/27/19 : 175 lb 3.2 oz (79.5 kg)  08/17/19 : 1 Organizations:       Attends Club or Organization Meetings:       Marital Status:   Intimate Partner Violence:       Fear of Current or Ex-Partner:       Emotionally Abused:       Physically Abused:       Sexually Abused:   Family History:  Family History seizures, dizziness, syncope, paralysis, ataxia, numbness or tingling in the extremities  HEMATOLOGIC:  Denies anemia, bleeding or bruising. LYMPHATICS:  Denies enlarged nodes  PSYCHIATRIC:  Denies depression or anxiety.   ENDOCRINOLOGIC:  Denies excessive affect. Behavior is normal. Judgement and thought content are normal    ASSESSMENT AND PLAN:   1.  Pedal edema  New issue discussed with patient potential relation to nutrition versus hydration status versus varicose veins or a slight combination of all of

## 2021-03-26 NOTE — TELEPHONE ENCOUNTER
Patient has been lightheaded, also states that her ankle is swollen but she didn't injure it. Has appt for tomorrow, ok to wait?

## 2021-03-26 NOTE — PATIENT INSTRUCTIONS
Encourage hydration and healthy diet    Encourage walking     Limit use of kneeler chair-- try 1 hour at a time    rec fasting labs

## 2021-03-26 NOTE — TELEPHONE ENCOUNTER
Spoke to pt. She recently went back to work for about 1 month now, has felt more tired lately, attributes to being back to work. Pt stated that she has been feeling lightheaded last couple days.  Also that she noticed last night that both ankles are slig

## 2021-03-27 ENCOUNTER — LABORATORY ENCOUNTER (OUTPATIENT)
Dept: LAB | Age: 56
End: 2021-03-27
Attending: FAMILY MEDICINE
Payer: COMMERCIAL

## 2021-03-27 DIAGNOSIS — R42 VERTIGO: ICD-10-CM

## 2021-03-27 DIAGNOSIS — R60.0 PEDAL EDEMA: ICD-10-CM

## 2021-03-27 DIAGNOSIS — D64.9 ANEMIA, UNSPECIFIED TYPE: ICD-10-CM

## 2021-03-27 LAB
ALBUMIN SERPL-MCNC: 3.7 G/DL (ref 3.4–5)
ALBUMIN/GLOB SERPL: 1.2 {RATIO} (ref 1–2)
ALP LIVER SERPL-CCNC: 58 U/L
ALT SERPL-CCNC: 16 U/L
ANION GAP SERPL CALC-SCNC: 4 MMOL/L (ref 0–18)
AST SERPL-CCNC: 13 U/L (ref 15–37)
BASOPHILS # BLD AUTO: 0.04 X10(3) UL (ref 0–0.2)
BASOPHILS NFR BLD AUTO: 0.8 %
BILIRUB SERPL-MCNC: 0.5 MG/DL (ref 0.1–2)
BILIRUB UR QL STRIP.AUTO: NEGATIVE
BUN BLD-MCNC: 16 MG/DL (ref 7–18)
BUN/CREAT SERPL: 17 (ref 10–20)
CALCIUM BLD-MCNC: 9.1 MG/DL (ref 8.5–10.1)
CHLORIDE SERPL-SCNC: 107 MMOL/L (ref 98–112)
CHOLEST SMN-MCNC: 225 MG/DL (ref ?–200)
CO2 SERPL-SCNC: 26 MMOL/L (ref 21–32)
COLOR UR AUTO: YELLOW
CREAT BLD-MCNC: 0.94 MG/DL
DEPRECATED RDW RBC AUTO: 43 FL (ref 35.1–46.3)
EOSINOPHIL # BLD AUTO: 0.09 X10(3) UL (ref 0–0.7)
EOSINOPHIL NFR BLD AUTO: 1.8 %
ERYTHROCYTE [DISTWIDTH] IN BLOOD BY AUTOMATED COUNT: 13 % (ref 11–15)
GLOBULIN PLAS-MCNC: 3.1 G/DL (ref 2.8–4.4)
GLUCOSE BLD-MCNC: 91 MG/DL (ref 70–99)
GLUCOSE UR STRIP.AUTO-MCNC: NEGATIVE MG/DL
HAV IGM SER QL: 2.3 MG/DL (ref 1.6–2.6)
HCT VFR BLD AUTO: 36.1 %
HDLC SERPL-MCNC: 85 MG/DL (ref 40–59)
HGB BLD-MCNC: 11.3 G/DL
IMM GRANULOCYTES # BLD AUTO: 0.01 X10(3) UL (ref 0–1)
IMM GRANULOCYTES NFR BLD: 0.2 %
KETONES UR STRIP.AUTO-MCNC: NEGATIVE MG/DL
LDLC SERPL CALC-MCNC: 127 MG/DL (ref ?–100)
LEUKOCYTE ESTERASE UR QL STRIP.AUTO: NEGATIVE
LYMPHOCYTES # BLD AUTO: 1.41 X10(3) UL (ref 1–4)
LYMPHOCYTES NFR BLD AUTO: 27.4 %
M PROTEIN MFR SERPL ELPH: 6.8 G/DL (ref 6.4–8.2)
MCH RBC QN AUTO: 28.7 PG (ref 26–34)
MCHC RBC AUTO-ENTMCNC: 31.3 G/DL (ref 31–37)
MCV RBC AUTO: 91.6 FL
MONOCYTES # BLD AUTO: 0.39 X10(3) UL (ref 0.1–1)
MONOCYTES NFR BLD AUTO: 7.6 %
NEUTROPHILS # BLD AUTO: 3.2 X10 (3) UL (ref 1.5–7.7)
NEUTROPHILS # BLD AUTO: 3.2 X10(3) UL (ref 1.5–7.7)
NEUTROPHILS NFR BLD AUTO: 62.2 %
NITRITE UR QL STRIP.AUTO: NEGATIVE
NONHDLC SERPL-MCNC: 140 MG/DL (ref ?–130)
OSMOLALITY SERPL CALC.SUM OF ELEC: 285 MOSM/KG (ref 275–295)
PATIENT FASTING Y/N/NP: YES
PATIENT FASTING Y/N/NP: YES
PH UR STRIP.AUTO: 5 [PH] (ref 5–8)
PLATELET # BLD AUTO: 268 10(3)UL (ref 150–450)
POTASSIUM SERPL-SCNC: 4.3 MMOL/L (ref 3.5–5.1)
PROT UR STRIP.AUTO-MCNC: NEGATIVE MG/DL
RBC # BLD AUTO: 3.94 X10(6)UL
RBC UR QL AUTO: NEGATIVE
SODIUM SERPL-SCNC: 137 MMOL/L (ref 136–145)
SP GR UR STRIP.AUTO: 1.02 (ref 1–1.03)
T4 FREE SERPL-MCNC: 1 NG/DL (ref 0.8–1.7)
TRIGL SERPL-MCNC: 63 MG/DL (ref 30–149)
TSI SER-ACNC: 1.91 MIU/ML (ref 0.36–3.74)
UROBILINOGEN UR STRIP.AUTO-MCNC: <2 MG/DL
VIT B12 SERPL-MCNC: 346 PG/ML (ref 193–986)
VIT D+METAB SERPL-MCNC: 48.6 NG/ML (ref 30–100)
VLDLC SERPL CALC-MCNC: 13 MG/DL (ref 0–30)
WBC # BLD AUTO: 5.1 X10(3) UL (ref 4–11)

## 2021-03-27 PROCEDURE — 83540 ASSAY OF IRON: CPT | Performed by: FAMILY MEDICINE

## 2021-03-27 PROCEDURE — 80050 GENERAL HEALTH PANEL: CPT | Performed by: FAMILY MEDICINE

## 2021-03-27 PROCEDURE — 81003 URINALYSIS AUTO W/O SCOPE: CPT | Performed by: FAMILY MEDICINE

## 2021-03-27 PROCEDURE — 84439 ASSAY OF FREE THYROXINE: CPT | Performed by: FAMILY MEDICINE

## 2021-03-27 PROCEDURE — 82306 VITAMIN D 25 HYDROXY: CPT | Performed by: FAMILY MEDICINE

## 2021-03-27 PROCEDURE — 36415 COLL VENOUS BLD VENIPUNCTURE: CPT | Performed by: FAMILY MEDICINE

## 2021-03-27 PROCEDURE — 83735 ASSAY OF MAGNESIUM: CPT | Performed by: FAMILY MEDICINE

## 2021-03-27 PROCEDURE — 82728 ASSAY OF FERRITIN: CPT | Performed by: FAMILY MEDICINE

## 2021-03-27 PROCEDURE — 80061 LIPID PANEL: CPT | Performed by: FAMILY MEDICINE

## 2021-03-27 PROCEDURE — 83550 IRON BINDING TEST: CPT | Performed by: FAMILY MEDICINE

## 2021-03-27 PROCEDURE — 82607 VITAMIN B-12: CPT | Performed by: FAMILY MEDICINE

## 2021-03-28 LAB
DEPRECATED HBV CORE AB SER IA-ACNC: 6.9 NG/ML
IRON SATURATION: 8 %
IRON SERPL-MCNC: 37 UG/DL
TOTAL IRON BINDING CAPACITY: 444 UG/DL (ref 240–450)
TRANSFERRIN SERPL-MCNC: 298 MG/DL (ref 200–360)

## 2021-03-29 ENCOUNTER — TELEPHONE (OUTPATIENT)
Dept: FAMILY MEDICINE CLINIC | Facility: CLINIC | Age: 56
End: 2021-03-29

## 2021-03-29 DIAGNOSIS — D50.9 IRON DEFICIENCY ANEMIA, UNSPECIFIED IRON DEFICIENCY ANEMIA TYPE: Primary | ICD-10-CM

## 2021-03-29 RX ORDER — MULTIVIT-MIN/IRON FUM/FOLIC AC 7.5 MG-4
1 TABLET ORAL DAILY
COMMUNITY

## 2021-03-29 RX ORDER — CHOLECALCIFEROL (VITAMIN D3) 50 MCG
1 CAPSULE ORAL DAILY
COMMUNITY

## 2021-03-29 RX ORDER — MELATONIN
325 2 TIMES DAILY
COMMUNITY

## 2021-03-29 NOTE — TELEPHONE ENCOUNTER
----- Message from Bob Ch MD sent at 3/29/2021  7:40 AM CDT -----  Laboratory results indicate iron deficiency anemia. Patient recommended to take iron supplement 325 mg twice a day and recheck levels in 6 to 8 weeks.   Also encourage foods hig

## 2021-03-29 NOTE — TELEPHONE ENCOUNTER
Pt informed, pt verbalized understanding. Future lab orders entered, awaiting review. Pt agreed to call back to schedule lab appt. Pt mentioned that she donated blood 2 wks ago. Pt states she was told she was fine to donate at that time.   Pt stat

## 2021-03-29 NOTE — TELEPHONE ENCOUNTER
----- Message from Mariano Zavala MD sent at 3/28/2021  5:20 PM CDT -----  Lab results reviewed  Cbc- indicates anemia  Iron panel added to labs, pending  B12- low end of normal- rec vit B complex MVI daily  Vit D and Mg -normal  Chemistry panel-normal

## 2021-03-29 NOTE — TELEPHONE ENCOUNTER
Pt informed. Pt verbalized understanding. See phone telephone encounter dated 3/29/21.   Supplement recommendations updated to med list.

## 2021-03-29 NOTE — TELEPHONE ENCOUNTER
I would not recommend donation of blood until patient confirms that her iron levels are back to normal.  Iron deficiency may be contributing to some of her symptoms of concern.

## 2021-08-14 ENCOUNTER — TELEPHONE (OUTPATIENT)
Dept: FAMILY MEDICINE CLINIC | Facility: CLINIC | Age: 56
End: 2021-08-14

## 2021-08-14 NOTE — TELEPHONE ENCOUNTER
If patient with exposure to symptomatic person then at risk for illness within 14 days of exposure- it symptoms or positive testing; then 10 day quarantine advised    Pt recommended to have testing and call if symptoms/ illness

## 2021-08-14 NOTE — TELEPHONE ENCOUNTER
Pt states she was with her 10year old grandson on [place]. Pt states he tested positive on yesterday with rapid test at school  Pt denies any s/s. Pt states she is getting mixed messages re: quarantine.   Pt states pt's family was told to quarantine 10 day

## 2021-08-14 NOTE — TELEPHONE ENCOUNTER
saw grandson on Wed, now he was diagnosed with covid on Friday. no sure if she needs to be quarantined .  has not been vacinated

## 2022-02-25 LAB — AMB EXT COVID-19 RESULT: DETECTED

## 2022-02-28 ENCOUNTER — OFFICE VISIT (OUTPATIENT)
Dept: FAMILY MEDICINE CLINIC | Facility: CLINIC | Age: 57
End: 2022-02-28
Payer: COMMERCIAL

## 2022-02-28 ENCOUNTER — TELEPHONE (OUTPATIENT)
Dept: FAMILY MEDICINE CLINIC | Facility: CLINIC | Age: 57
End: 2022-02-28

## 2022-02-28 VITALS
OXYGEN SATURATION: 99 % | RESPIRATION RATE: 16 BRPM | HEART RATE: 72 BPM | SYSTOLIC BLOOD PRESSURE: 116 MMHG | DIASTOLIC BLOOD PRESSURE: 78 MMHG | TEMPERATURE: 98 F

## 2022-02-28 DIAGNOSIS — U07.1 COVID-19: Primary | ICD-10-CM

## 2022-02-28 PROCEDURE — 99213 OFFICE O/P EST LOW 20 MIN: CPT | Performed by: NURSE PRACTITIONER

## 2022-02-28 PROCEDURE — 3078F DIAST BP <80 MM HG: CPT | Performed by: NURSE PRACTITIONER

## 2022-02-28 PROCEDURE — 3074F SYST BP LT 130 MM HG: CPT | Performed by: NURSE PRACTITIONER

## 2022-02-28 NOTE — TELEPHONE ENCOUNTER
+ COVID - has question about returning to work.   + COVID on 2/25/2022 with symptoms starting on 2/21/2022. Vaccinated with 2nd shot end of January, but has not had the booster shot. Only symptoms now is head congestion & vocal cords strain. Never has had fever. Work told her she can return to work today as long as she is masked, but she just wants to make sure she if following the correct guidelines.

## 2022-02-28 NOTE — PATIENT INSTRUCTIONS
Rest, increase fluids, salt water gargles ,neti pot (use distilled water) or saline nasal spray, Nasacort 2 sprays each nostril,  Ibuprofen,  follow up if symptoms persist or increase.

## 2022-02-28 NOTE — TELEPHONE ENCOUNTER
Pt was out to dinner with friends on 2/18 and then also babysat her grandchildren on 2/19. Pt states her grandkids had cold s/s. Pt woke up with cold s/s on 2/20. Pt states she had hoarse voice and some thick phlegm develop- lasted 2/21-2/24. Pt found out on 2/24- her friend tested positive for covid. Pt states she then tested positive on 2/25. Currently pt has mild cough, chest congestion with yellow sputum, increased runny nose. Pt states she has hx: of bronchitis. Pt scheduled for sick clinic appt this afternoon with EL.     Future Appointments   Date Time Provider Quinton Gonzalez   2/28/2022  3:00 PM EDDY Smith

## 2022-03-31 NOTE — PATIENT INSTRUCTIONS
Rest, wear supportive shoes-consider Dr. Orellana Presser inserts or orthotics, you may try heat or ice to your back. Take meloxicam once a day with food. You may take cyclobenzaprine at bedtime for muscle relaxation.     Follow-up with physical therapy
impairments found/functional limitations in following categories/therapy frequency

## 2023-03-24 ENCOUNTER — OFFICE VISIT (OUTPATIENT)
Dept: FAMILY MEDICINE CLINIC | Facility: CLINIC | Age: 58
End: 2023-03-24
Payer: COMMERCIAL

## 2023-03-24 ENCOUNTER — LAB ENCOUNTER (OUTPATIENT)
Dept: LAB | Age: 58
End: 2023-03-24
Attending: NURSE PRACTITIONER
Payer: COMMERCIAL

## 2023-03-24 ENCOUNTER — TELEPHONE (OUTPATIENT)
Dept: FAMILY MEDICINE CLINIC | Facility: CLINIC | Age: 58
End: 2023-03-24

## 2023-03-24 VITALS
BODY MASS INDEX: 26.01 KG/M2 | TEMPERATURE: 98 F | RESPIRATION RATE: 18 BRPM | HEART RATE: 68 BPM | DIASTOLIC BLOOD PRESSURE: 62 MMHG | HEIGHT: 68 IN | WEIGHT: 171.63 LBS | OXYGEN SATURATION: 98 % | SYSTOLIC BLOOD PRESSURE: 98 MMHG

## 2023-03-24 DIAGNOSIS — R00.2 PALPITATIONS: Primary | ICD-10-CM

## 2023-03-24 DIAGNOSIS — R00.2 PALPITATIONS: ICD-10-CM

## 2023-03-24 LAB
ALBUMIN SERPL-MCNC: 3.9 G/DL (ref 3.4–5)
ALBUMIN/GLOB SERPL: 1.2 {RATIO} (ref 1–2)
ALP LIVER SERPL-CCNC: 61 U/L
ALT SERPL-CCNC: 18 U/L
ANION GAP SERPL CALC-SCNC: 5 MMOL/L (ref 0–18)
AST SERPL-CCNC: 13 U/L (ref 15–37)
BASOPHILS # BLD AUTO: 0.04 X10(3) UL (ref 0–0.2)
BASOPHILS NFR BLD AUTO: 0.6 %
BILIRUB SERPL-MCNC: 0.5 MG/DL (ref 0.1–2)
BUN BLD-MCNC: 13 MG/DL (ref 7–18)
CALCIUM BLD-MCNC: 9.3 MG/DL (ref 8.5–10.1)
CHLORIDE SERPL-SCNC: 106 MMOL/L (ref 98–112)
CO2 SERPL-SCNC: 28 MMOL/L (ref 21–32)
CREAT BLD-MCNC: 0.92 MG/DL
EOSINOPHIL # BLD AUTO: 0.15 X10(3) UL (ref 0–0.7)
EOSINOPHIL NFR BLD AUTO: 2.1 %
ERYTHROCYTE [DISTWIDTH] IN BLOOD BY AUTOMATED COUNT: 12.5 %
FASTING STATUS PATIENT QL REPORTED: NO
GFR SERPLBLD BASED ON 1.73 SQ M-ARVRAT: 72 ML/MIN/1.73M2 (ref 60–?)
GLOBULIN PLAS-MCNC: 3.3 G/DL (ref 2.8–4.4)
GLUCOSE BLD-MCNC: 97 MG/DL (ref 70–99)
HCT VFR BLD AUTO: 42.6 %
HGB BLD-MCNC: 14.1 G/DL
IMM GRANULOCYTES # BLD AUTO: 0.02 X10(3) UL (ref 0–1)
IMM GRANULOCYTES NFR BLD: 0.3 %
LYMPHOCYTES # BLD AUTO: 2.13 X10(3) UL (ref 1–4)
LYMPHOCYTES NFR BLD AUTO: 29.5 %
MCH RBC QN AUTO: 30.1 PG (ref 26–34)
MCHC RBC AUTO-ENTMCNC: 33.1 G/DL (ref 31–37)
MCV RBC AUTO: 91 FL
MONOCYTES # BLD AUTO: 0.5 X10(3) UL (ref 0.1–1)
MONOCYTES NFR BLD AUTO: 6.9 %
NEUTROPHILS # BLD AUTO: 4.39 X10 (3) UL (ref 1.5–7.7)
NEUTROPHILS # BLD AUTO: 4.39 X10(3) UL (ref 1.5–7.7)
NEUTROPHILS NFR BLD AUTO: 60.6 %
OSMOLALITY SERPL CALC.SUM OF ELEC: 288 MOSM/KG (ref 275–295)
PLATELET # BLD AUTO: 303 10(3)UL (ref 150–450)
POTASSIUM SERPL-SCNC: 3.8 MMOL/L (ref 3.5–5.1)
PROT SERPL-MCNC: 7.2 G/DL (ref 6.4–8.2)
RBC # BLD AUTO: 4.68 X10(6)UL
SODIUM SERPL-SCNC: 139 MMOL/L (ref 136–145)
WBC # BLD AUTO: 7.2 X10(3) UL (ref 4–11)

## 2023-03-24 PROCEDURE — 85025 COMPLETE CBC W/AUTO DIFF WBC: CPT | Performed by: NURSE PRACTITIONER

## 2023-03-24 PROCEDURE — 3008F BODY MASS INDEX DOCD: CPT | Performed by: NURSE PRACTITIONER

## 2023-03-24 PROCEDURE — 3074F SYST BP LT 130 MM HG: CPT | Performed by: NURSE PRACTITIONER

## 2023-03-24 PROCEDURE — 80053 COMPREHEN METABOLIC PANEL: CPT | Performed by: NURSE PRACTITIONER

## 2023-03-24 PROCEDURE — 93000 ELECTROCARDIOGRAM COMPLETE: CPT | Performed by: NURSE PRACTITIONER

## 2023-03-24 PROCEDURE — 3078F DIAST BP <80 MM HG: CPT | Performed by: NURSE PRACTITIONER

## 2023-03-24 PROCEDURE — 99213 OFFICE O/P EST LOW 20 MIN: CPT | Performed by: NURSE PRACTITIONER

## 2023-03-24 RX ORDER — ASPIRIN 81 MG/1
81 TABLET, CHEWABLE ORAL DAILY
COMMUNITY

## 2023-03-24 NOTE — TELEPHONE ENCOUNTER
short of breath, irregular heartbeat when walking, call transferred to [address] [place] [address]

## 2023-03-24 NOTE — TELEPHONE ENCOUNTER
Spoke to patient - c/o of heart fluttering that started about an hour ago post walking on track. She states she has had this before and it just goes away. Able to talk in full sentences without distress; denies chest pain, denies arm numbness/pain. Patient does not know how to check her pulse. Appt today at 2  with CS - informed patient to bee 911 if symptoms worsen prior to appointment.

## 2023-03-24 NOTE — PATIENT INSTRUCTIONS
Labs pending. If labs normal and symptoms persisting, recommend Holter monitor. If worsens, go to the ER.

## 2023-06-27 NOTE — TELEPHONE ENCOUNTER
Pt due for PX. [facility]MS     Future appt:    Last Appointment with provider:   2/28/22(ill)  Last appointment at [facility]:  3/24/2023    Albuterol Sulfate (PROAIR RESPICLICK) 725 (90 Base) MCG/ACT Inhalation Aerosol Powder, Breath Activated  1 each  1refill    Filled:9/8/20            Summary: Inhale 2 puffs into the lungs every 4 to 6 hours as needed        ALPRAZolam Roshan Pries) 0.25 MG Oral Tab  20tab  0refill    Filled:9/8/20              Summary: Take 1 tablet (0.25 mg total) by mouth 3 (three) times daily as needed (1 tablet one half hour prior to flight). 1 tab by mouth 1/2 hour before flight. May repeat once during flight          Cholesterol, Total (mg/dL)   Date Value   03/27/2021 225 (H)     HDL Cholesterol (mg/dL)   Date Value   03/27/2021 85 (H)     LDL Cholesterol (mg/dL)   Date Value   03/27/2021 127 (H)     Triglycerides (mg/dL)   Date Value   03/27/2021 63     No results found for: EAG, A1C  Lab Results   Component Value Date    T4F 1.0 03/27/2021    TSH 1.910 03/27/2021       No follow-ups on file.

## 2023-07-11 RX ORDER — ALPRAZOLAM 0.25 MG/1
0.25 TABLET ORAL 3 TIMES DAILY PRN
Qty: 20 TABLET | Refills: 0 | OUTPATIENT
Start: 2023-07-11

## 2023-07-11 RX ORDER — ALBUTEROL SULFATE 90 UG/1
2 POWDER, METERED RESPIRATORY (INHALATION)
Qty: 1 EACH | Refills: 1 | OUTPATIENT
Start: 2023-07-11

## 2023-09-26 NOTE — TELEPHONE ENCOUNTER
11-Mar-2023 02:19 Addended by: STEPHEN MOHR on: 9/26/2023 05:48 PM     Modules accepted: Orders     Patient informed of recommendations. Expressed understanding. Informed patient she should not use [employer] for neti pott solution.  Should buy the powder they sell for netti pot Expressed understanding

## (undated) NOTE — MR AVS SNAPSHOT
[address]  [place] [address] [id number]  [phone]               Thank you for choosing us for your health care visit with Lance Yee MD.  We are glad to serve you and happy to provide you with this summary of information, go to [email or web address] and click on the Sign Up Now link in the [employer] box. Enter your [employer] Activation Code exactly as it appears below along with your Zip Code and Date of Birth to complete the sign-up process.  If you do You don’t need to join a gym. Home exercises work great.  Put more priority on exercise in your life                    Visit [facility] online at  formerly [email or web address].tn

## (undated) NOTE — MR AVS SNAPSHOT
[address]  [place] [address] [id number]  [phone]               Thank you for choosing us for your health care visit with ALEKSANDR Carreon.   We are glad to serve you and happy to provide you with this summary o - reasons to take this   Commonly known as:  XANAX           Amoxicillin-Pot Clavulanate 875-125 MG Tabs   Take 1 tablet by mouth 2 (two) times daily.    Commonly known as:  AUGMENTIN           NASONEX 50 MCG/ACT Susp   Generic drug:  Mometasone Furoate   1 Support Staff. Remember, MyChart is NOT to be used for urgent needs. For medical emergencies, dial 911.            Visit [facility] online at  [email or web address]

## (undated) NOTE — LETTER
Date: 4/10/2018    Patient Name: Benton Pradhan          To Whom it may concern: This letter has been written at the patient's request. The above patient was seen at the [facility] for treatment of a medical condition.     This patient